# Patient Record
Sex: FEMALE | Race: WHITE | Employment: OTHER | URBAN - METROPOLITAN AREA
[De-identification: names, ages, dates, MRNs, and addresses within clinical notes are randomized per-mention and may not be internally consistent; named-entity substitution may affect disease eponyms.]

---

## 2024-06-11 DIAGNOSIS — M25.531 RIGHT WRIST PAIN: Primary | ICD-10-CM

## 2024-06-14 ENCOUNTER — OFFICE VISIT (OUTPATIENT)
Dept: ORTHOPEDIC SURGERY | Age: 74
End: 2024-06-14

## 2024-06-14 DIAGNOSIS — M79.644 PAIN OF RIGHT THUMB: Primary | ICD-10-CM

## 2024-06-14 RX ORDER — METHYLPREDNISOLONE ACETATE 40 MG/ML
40 INJECTION, SUSPENSION INTRA-ARTICULAR; INTRALESIONAL; INTRAMUSCULAR; SOFT TISSUE ONCE
Status: SHIPPED | OUTPATIENT
Start: 2024-06-14

## 2024-06-14 RX ORDER — BUPIVACAINE HYDROCHLORIDE 2.5 MG/ML
0.5 INJECTION, SOLUTION INFILTRATION; PERINEURAL ONCE
Status: SHIPPED | OUTPATIENT
Start: 2024-06-14

## 2024-06-14 RX ORDER — LIDOCAINE HYDROCHLORIDE 10 MG/ML
0.5 INJECTION, SOLUTION INFILTRATION; PERINEURAL ONCE
Status: SHIPPED | OUTPATIENT
Start: 2024-06-14

## 2024-06-14 ASSESSMENT — ENCOUNTER SYMPTOMS
VOMITING: 0
NAUSEA: 0
COLOR CHANGE: 0

## 2024-06-14 NOTE — PROGRESS NOTES
of Depomedrol.  Sterile dressing was applied.  The patient tolerated the procedure well without post injection complications.     ASSESSMENT:     1. Pain of right thumb         PLAN:  Assessment & Plan     The patient presents today for evaluation of her right thumb pain.  She has had this pain for approximately 5 weeks now with no known injuries.  X-rays of the right wrist were taken today in the office and these were reviewed with the patient.  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  There are also calcifications throughout the TFCC of the right wrist.  These images were reviewed with the patient today.  Patient has pain localized to the first extensor compartment of the wrist.  She also has mild soft tissue swelling along the first extensor compartment.  I informed the patient of the diagnosis of de Quervain's tenosynovitis as well as the treatment options.  At this time she would like to proceed with a corticosteroid injection and a thumb spica splint.  I did the injection today in office and she tolerated it well.  She may purchase the thumb spica splint online.  I also advised her to use topical Voltaren gel up to 4 times per day along the area.  She will follow-up with me in 1 month for reassessment of her pain.     Return in about 4 weeks (around 7/12/2024) for follow up of right thumb pain.        Orders Placed This Encounter   Medications    methylPREDNISolone acetate (DEPO-MEDROL) injection 40 mg    BUPivacaine (MARCAINE) 0.25 % injection 1.25 mg    lidocaine 1 % injection 0.5 mL       Orders Placed This Encounter   Procedures    53549 - [place] INJECT TENDON SHEATH/LIGAMENT       This note is created with the assistance of a speech recognition program.  While intending to generate a document that actually reflects the content of the visit, the document can still have some errors including those of syntax and sound a like substitutions which may escape proof reading.  In such instances, actual meaning

## 2024-11-15 ENCOUNTER — OFFICE VISIT (OUTPATIENT)
Dept: ORTHOPEDIC SURGERY | Age: 74
End: 2024-11-15

## 2024-11-15 VITALS — HEIGHT: 64 IN | WEIGHT: 119 LBS | OXYGEN SATURATION: 99 % | BODY MASS INDEX: 20.32 KG/M2 | RESPIRATION RATE: 17 BRPM

## 2024-11-15 DIAGNOSIS — M65.4 DE QUERVAIN'S TENOSYNOVITIS, RIGHT: Primary | ICD-10-CM

## 2024-11-15 RX ORDER — LIDOCAINE HYDROCHLORIDE 10 MG/ML
0.5 INJECTION, SOLUTION INFILTRATION; PERINEURAL ONCE
Status: COMPLETED | OUTPATIENT
Start: 2024-11-15 | End: 2024-11-15

## 2024-11-15 RX ORDER — METHYLPREDNISOLONE ACETATE 40 MG/ML
40 INJECTION, SUSPENSION INTRA-ARTICULAR; INTRALESIONAL; INTRAMUSCULAR; SOFT TISSUE ONCE
Status: COMPLETED | OUTPATIENT
Start: 2024-11-15 | End: 2024-11-15

## 2024-11-15 RX ORDER — BUPIVACAINE HYDROCHLORIDE 2.5 MG/ML
0.5 INJECTION, SOLUTION INFILTRATION; PERINEURAL ONCE
Status: COMPLETED | OUTPATIENT
Start: 2024-11-15 | End: 2024-11-15

## 2024-11-15 RX ADMIN — LIDOCAINE HYDROCHLORIDE 0.5 ML: 10 INJECTION, SOLUTION INFILTRATION; PERINEURAL at 09:56

## 2024-11-15 RX ADMIN — BUPIVACAINE HYDROCHLORIDE 1.25 MG: 2.5 INJECTION, SOLUTION INFILTRATION; PERINEURAL at 09:56

## 2024-11-15 RX ADMIN — METHYLPREDNISOLONE ACETATE 40 MG: 40 INJECTION, SUSPENSION INTRA-ARTICULAR; INTRALESIONAL; INTRAMUSCULAR; SOFT TISSUE at 09:56

## 2024-11-15 NOTE — PATIENT INSTRUCTIONS
PATIENTIQ:  PatientIQ helps [facility] stay in touch with you to know how you're feeling, and provides education and care instructions to you at various time points.   Your answers help your care team track your progress to provide the best care possible. PatientIQ will contact you pre-op and post-op via email or text with:  Educational Videos and Care Instructions  Questionnaires About How You're Feeling    Your participation provides you valuable education and helps [facility] continue to provide quality care to all patients. Thank you
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
